# Patient Record
Sex: MALE | Race: WHITE | NOT HISPANIC OR LATINO | Employment: FULL TIME | URBAN - METROPOLITAN AREA
[De-identification: names, ages, dates, MRNs, and addresses within clinical notes are randomized per-mention and may not be internally consistent; named-entity substitution may affect disease eponyms.]

---

## 2019-01-31 ENCOUNTER — OFFICE VISIT (OUTPATIENT)
Dept: PSYCHIATRY | Facility: CLINIC | Age: 44
End: 2019-01-31

## 2019-01-31 DIAGNOSIS — G89.29 OTHER CHRONIC PAIN: Primary | ICD-10-CM

## 2019-01-31 DIAGNOSIS — F41.9 ANXIETY: ICD-10-CM

## 2019-01-31 PROCEDURE — 90791 PSYCH DIAGNOSTIC EVALUATION: CPT | Performed by: PSYCHOLOGIST

## 2021-05-12 ENCOUNTER — OFFICE VISIT (OUTPATIENT)
Dept: ORTHOPEDIC SURGERY | Facility: CLINIC | Age: 46
End: 2021-05-12

## 2021-05-12 VITALS
HEIGHT: 68 IN | WEIGHT: 224.4 LBS | HEART RATE: 106 BPM | BODY MASS INDEX: 34.01 KG/M2 | DIASTOLIC BLOOD PRESSURE: 89 MMHG | SYSTOLIC BLOOD PRESSURE: 147 MMHG

## 2021-05-12 DIAGNOSIS — Z98.890 HISTORY OF SHOULDER SURGERY: ICD-10-CM

## 2021-05-12 DIAGNOSIS — M25.512 LEFT SHOULDER PAIN, UNSPECIFIED CHRONICITY: Primary | ICD-10-CM

## 2021-05-12 PROCEDURE — 99204 OFFICE O/P NEW MOD 45 MIN: CPT | Performed by: PHYSICIAN ASSISTANT

## 2021-05-12 RX ORDER — ALLOPURINOL 300 MG/1
TABLET ORAL
COMMUNITY
Start: 2021-04-23

## 2021-05-12 RX ORDER — LISINOPRIL 10 MG/1
TABLET ORAL
COMMUNITY
Start: 2021-04-23 | End: 2021-09-23

## 2021-05-12 RX ORDER — MONTELUKAST SODIUM 10 MG/1
TABLET ORAL
COMMUNITY
Start: 2021-05-03

## 2021-05-12 RX ORDER — HYDROCHLOROTHIAZIDE 25 MG/1
TABLET ORAL
COMMUNITY
Start: 2021-04-23

## 2021-05-12 RX ORDER — MELOXICAM 15 MG/1
15 TABLET ORAL DAILY
Qty: 30 TABLET | Refills: 2 | Status: SHIPPED | OUTPATIENT
Start: 2021-05-12 | End: 2021-08-26

## 2021-05-12 NOTE — PROGRESS NOTES
"      [facility] [place] Orthopaedic Surgery Clinic Note    Subjective     CC: Pain of the Left Shoulder      HPI        Zack Crockett is a 46 y.o. male who presents with new problem of: left shoulder pain.  Onset: atraumatic and gradual in nature. The issue has been ongoing for 6 month(s). Pain is a 5/10 on the pain scale. Pain is described as aching. Associated symptoms include pain. The pain is worse with any movement of the joint; nothing improve the pain. Previous treatments have included: physical therapy and Surgery RCR 1/7/21 Dr. oLvett at .    I have reviewed the following portions of the patient's history:{Fara HPI:83692::\"History of Present Illness\"} and review of systems.            Review of Systems   Constitutional: Negative.  Negative for chills, fatigue and fever.   HENT: Negative.  Negative for congestion and dental problem.    Eyes: Negative.  Negative for blurred vision.   Respiratory: Negative.  Negative for shortness of breath.    Cardiovascular: Negative.  Negative for leg swelling.   Gastrointestinal: Negative.  Negative for abdominal pain.   Endocrine: Negative.  Negative for polyuria.   Genitourinary: Negative.  Negative for difficulty urinating.   Musculoskeletal: Positive for arthralgias.   Skin: Negative.    Allergic/Immunologic: Negative.    Neurological: Negative.    Hematological: Negative.  Negative for adenopathy.   Psychiatric/Behavioral: Negative.  Negative for behavioral problems.       ROS:    Constiutional:Pt denies fever, chills, nausea, or vomiting.  MSK:as above      Objective      Past Medical History  History reviewed. No pertinent past medical history.      Physical Exam  /89   Pulse 106   Ht 172.7 cm (68\")   Wt 102 kg (224 lb 6.4 oz)   BMI 34.12 kg/m²     Body mass index is 34.12 kg/m².    Patient is well nourished and well developed.        Ortho Exam  ***    Imaging/Labs/EMG Reviewed:  Imaging Results (Last 24 Hours)     ** No results found for the last 24 hours. **   "          Assessment:  No diagnosis found.    Plan:  1. Recommend over the counter anti-inflammatories for pain and/or swelling  2. ***    Follow Up:   No follow-ups on file.      Medical Decision Making  Management Options : {[facility] 21:[id number]}        Ashvin Singh M.D., FAAOS  Orthopedic Surgeon  Fellowship Trained Sports Medicine  [facility]  Orthopedics and Sports Medicine  [address] [place] [address], [address]  [place] [address]

## 2021-05-13 NOTE — PROGRESS NOTES
[facility] [place] Orthopaedic Surgery Clinic Note    Subjective     Patient: Zack Crockett  : 1975    Primary Care Provider: Provider, No Known    Requesting Provider: As above    Pain of the Left Shoulder      History    Chief Complaint: Left shoulder pain    History of Present Illness: This is a very pleasant 46-year-old male presenting to discuss his left shoulder pain that has been bothering him for approximately 6 months.  He is left-hand dominant.  He is status post left shoulder arthroscopy with debridement, labral repair, biceps tenodesis, rotator cuff repair and subacromial decompression with Dr. Arredondo in 2021.  He reports he had a period of time that he was pain-free after physical therapy.  He returned to work as a  in April and began having return pain at that time.  It is shoulder pain with some radiating arm pain.  He is also complaining of cervical pain headaches and tinnitus.  He has pain with any use of the shoulder as well as forearm rotation.  He is having decreased range of motion.    Current Outpatient Medications on File Prior to Visit   Medication Sig Dispense Refill   • allopurinol (ZYLOPRIM) 300 MG tablet      • hydroCHLOROthiazide (HYDRODIURIL) 25 MG tablet      • lisinopril (PRINIVIL,ZESTRIL) 10 MG tablet      • montelukast (SINGULAIR) 10 MG tablet        No current facility-administered medications on file prior to visit.      No Known Allergies   History reviewed. No pertinent past medical history.  History reviewed. No pertinent surgical history.  Family History   Problem Relation Age of Onset   • Cancer Mother    • Diabetes Mother    • Hypertension Mother    • Cancer Father    • Hypertension Father       Social History     Socioeconomic History   • Marital status:      Spouse name: Not on file   • Number of children: Not on file   • Years of education: Not on file   • Highest education level: Not on file   Tobacco Use   • Smoking status: Never Smoker   •  "Smokeless tobacco: Never Used   Substance and Sexual Activity   • Alcohol use: Never   • Drug use: Never   • Sexual activity: Defer        Review of Systems    The following portions of the patient's history were reviewed and updated as appropriate: allergies, current medications, past family history, past medical history, past social history, past surgical history and problem list.      Objective      Physical Exam  /89   Pulse 106   Ht 172.7 cm (68\")   Wt 102 kg (224 lb 6.4 oz)   BMI 34.12 kg/m²     Body mass index is 34.12 kg/m².    GENERAL: Body habitus: obese    Gait: normal     Mental Status:  awake and alert; oriented to person, place, and time    Voice:  clear  SKIN:  Normal    Hair Growth:  Right:normal; Left:  normal  HEENT: Head: Normocephalic, atraumatic,  without obvious abnormality.  eye: normal external eye, no icterus   PULM:  Repiratory effort normal    Ortho Exam  Musculoskeletal   Upper Extremity   Left Shoulder     Inspection and Palpation:     Tenderness -tender over the anterior lateral acromion.  Tenderness over the traps.  Tender at the bicipital groove.    Crepitus - none    Sensation is normal    Examination reveals no ecchymosis.      Strength and Tone:    Supraspinatus - 4/5    External Rotators-4/5    Infraspinatus - 4/5    Subscapularis - 4/5    Deltoid - 5/5     Range of Motion   Right shoulder:    Internal Rotation: ROM - T7    External Rotation: AROM - 80 degrees    Elevation through flexion: AROM - 180 degrees    Left Shoulder:        External Rotation: AROM - 30 degrees    Elevation through flexion: AROM - 140 degrees     Abduction AROM - 90      Impingement   Left shoulder    Arreguin-John impingement test negative    Neer impingement test get of     Functional Testing   Left shoulder    AC crossover adduction test negative      Medical Decision Making    Data Review:   ordered and reviewed x-rays today and reviewed outside records    Assessment:  1. Left shoulder " pain, unspecified chronicity    2. History of shoulder surgery        Plan:  Left shoulder pain status post left shoulder arthroscopy with debridement, labral repair, rotator cuff repair, biceps tenodesis and subacromial decompression in an outside clinic.  X-rays today show 2 metal anchors in the humeral head with no evidence of any acute or chronic bony findings.  Patient has had return pain following surgery after returning to work as a .  It is getting progressively worse.  Plan today is MRI arthrogram of the left shoulder for further evaluation.  He will return to see Dr. Singh following the study or sooner if needed.  I have given him a prescription for Mobic.    History, diagnosis and treatment plan discussed with Dr. Singh.      Gely Zelaya PA-C  05/13/21  10:36 EDT

## 2021-05-20 ENCOUNTER — TELEPHONE (OUTPATIENT)
Dept: ORTHOPEDIC SURGERY | Facility: CLINIC | Age: 46
End: 2021-05-20

## 2021-05-26 DIAGNOSIS — Z98.890 HISTORY OF SHOULDER SURGERY: ICD-10-CM

## 2021-05-26 DIAGNOSIS — M25.512 LEFT SHOULDER PAIN, UNSPECIFIED CHRONICITY: Primary | ICD-10-CM

## 2021-06-14 ENCOUNTER — HOSPITAL ENCOUNTER (OUTPATIENT)
Dept: GENERAL RADIOLOGY | Facility: HOSPITAL | Age: 46
Discharge: HOME | End: 2021-06-14

## 2021-06-14 ENCOUNTER — HOSPITAL ENCOUNTER (OUTPATIENT)
Dept: MRI IMAGING | Facility: HOSPITAL | Age: 46
Discharge: HOME | End: 2021-06-14

## 2021-06-14 DIAGNOSIS — Z98.890 HISTORY OF SHOULDER SURGERY: ICD-10-CM

## 2021-06-14 DIAGNOSIS — M25.512 LEFT SHOULDER PAIN, UNSPECIFIED CHRONICITY: ICD-10-CM

## 2021-06-14 PROCEDURE — A9577 INJ MULTIHANCE: HCPCS | Performed by: PHYSICIAN ASSISTANT

## 2021-06-14 PROCEDURE — 25010000002 IOPAMIDOL 61 % SOLUTION: Performed by: PHYSICIAN ASSISTANT

## 2021-06-14 PROCEDURE — 0 GADOBENATE DIMEGLUMINE 529 MG/ML SOLUTION: Performed by: PHYSICIAN ASSISTANT

## 2021-06-14 PROCEDURE — 77002 NEEDLE LOCALIZATION BY XRAY: CPT

## 2021-06-14 PROCEDURE — 73222 MRI JOINT UPR EXTREM W/DYE: CPT

## 2021-06-14 RX ORDER — LIDOCAINE HYDROCHLORIDE 10 MG/ML
10 INJECTION, SOLUTION EPIDURAL; INFILTRATION; INTRACAUDAL; PERINEURAL ONCE
Status: COMPLETED | OUTPATIENT
Start: 2021-06-14 | End: 2021-06-14

## 2021-06-14 RX ADMIN — IOPAMIDOL 10 ML: 612 INJECTION, SOLUTION INTRAVENOUS at 15:52

## 2021-06-14 RX ADMIN — LIDOCAINE HYDROCHLORIDE 10 ML: 10 INJECTION, SOLUTION EPIDURAL; INFILTRATION; INTRACAUDAL; PERINEURAL at 15:52

## 2021-06-14 RX ADMIN — GADOBENATE DIMEGLUMINE 1 ML: 529 INJECTION, SOLUTION INTRAVENOUS at 15:52

## 2021-06-22 ENCOUNTER — OFFICE VISIT (OUTPATIENT)
Dept: ORTHOPEDIC SURGERY | Facility: CLINIC | Age: 46
End: 2021-06-22

## 2021-06-22 VITALS
HEART RATE: 88 BPM | BODY MASS INDEX: 33.35 KG/M2 | HEIGHT: 68 IN | WEIGHT: 220.02 LBS | SYSTOLIC BLOOD PRESSURE: 133 MMHG | DIASTOLIC BLOOD PRESSURE: 88 MMHG

## 2021-06-22 DIAGNOSIS — M75.122 NONTRAUMATIC COMPLETE TEAR OF LEFT ROTATOR CUFF: Primary | ICD-10-CM

## 2021-06-22 PROCEDURE — 99214 OFFICE O/P EST MOD 30 MIN: CPT | Performed by: PHYSICIAN ASSISTANT

## 2021-06-22 NOTE — PROGRESS NOTES
I have reviewed the notes, assessments, and/or procedures performed by Gely Zelaya PA-C, I concur with her documentation of Zack Crockett.      Patient seen and examined with Gely Zelaya this morning.  Patient is a pleasant gentleman who is left-hand dominant who underwent left shoulder arthroscopy with reported labral repair, rotator cuff repair, biceps tenodesis, and subacromial decompression at  with Dr. Looney in January 2021.  Patient reportedly did quite well during his recovery and was sent back to work in April 2021 (this is not a work-related injury) as a  and feels like he overdid it and began to have trouble at work and increasing pain and dysfunction.  He was seen initially by Gely and an MR arthrogram was ordered and the patient is here for follow-up.    Patient's clinical examination shows full passive forward elevation.  His active forward elevation is to about 120 degrees.  He has external rotation to 60 degrees and internal rotation to L4.  He has 5-5 strength of his subscap.  He has 4- out of 5 strength of his supraspinatus and external rotators.  He is nontender at the AC joint to palpation.  There is no visible Quinn deformity.  He is tender about the course of the long head of the biceps to palpation    We reviewed images and report of his MR arthrogram of the left shoulder.  Patient appears to have an intact subscap.  He appears to have a fairly attenuated and torn and retracted supraspinatus and infraspinatus tear.  There does not appear to be significant amount of visible atrophy of the muscle bellies.    Assessment and plan: Recurrent left rotator cuff tear after repair and biceps tenodesis and reported labral repair--plan will be to obtain notes and operative note to figure out exactly where the biceps was tenodesed and how and also to figure out the rotator cuff repair construct.  Patient is having significant dysfunction and pain in the shoulder.  We discussed  the risk, benefits, potential hazards, typical recovery and rehab as well as reasonable alternatives to left shoulder arthroscopy, possible revision biceps tenodesis, revision rotator cuff repair with likely augmentation with Regeneten.  Patient had the opportunity to ask questions and wishes to proceed with scheduling.  We will get this done as an outpatient in the near future at the hospital.

## 2021-07-06 ENCOUNTER — APPOINTMENT (OUTPATIENT)
Dept: PREADMISSION TESTING | Facility: HOSPITAL | Age: 46
End: 2021-07-06

## 2021-07-06 LAB — SARS-COV-2 RNA PNL SPEC NAA+PROBE: NOT DETECTED

## 2021-07-06 PROCEDURE — C9803 HOPD COVID-19 SPEC COLLECT: HCPCS

## 2021-07-06 PROCEDURE — U0004 COV-19 TEST NON-CDC HGH THRU: HCPCS

## 2021-07-09 ENCOUNTER — OUTSIDE FACILITY SERVICE (OUTPATIENT)
Dept: ORTHOPEDIC SURGERY | Facility: CLINIC | Age: 46
End: 2021-07-09

## 2021-07-09 ENCOUNTER — LAB REQUISITION (OUTPATIENT)
Dept: LAB | Facility: HOSPITAL | Age: 46
End: 2021-07-09

## 2021-07-09 DIAGNOSIS — Z01.818 ENCOUNTER FOR OTHER PREPROCEDURAL EXAMINATION: ICD-10-CM

## 2021-07-09 DIAGNOSIS — Z98.890 S/P COMPLETE REPAIR OF ROTATOR CUFF: Primary | ICD-10-CM

## 2021-07-09 DIAGNOSIS — I10 ESSENTIAL (PRIMARY) HYPERTENSION: ICD-10-CM

## 2021-07-09 LAB — POTASSIUM SERPL-SCNC: 3.8 MMOL/L (ref 3.5–5.2)

## 2021-07-09 PROCEDURE — 29827 SHO ARTHRS SRG RT8TR CUF RPR: CPT | Performed by: ORTHOPAEDIC SURGERY

## 2021-07-09 PROCEDURE — 29823 SHO ARTHRS SRG XTNSV DBRDMT: CPT | Performed by: ORTHOPAEDIC SURGERY

## 2021-07-09 PROCEDURE — 84132 ASSAY OF SERUM POTASSIUM: CPT | Performed by: ORTHOPAEDIC SURGERY

## 2021-07-09 RX ORDER — HYDROCODONE BITARTRATE AND ACETAMINOPHEN 7.5; 325 MG/1; MG/1
1 TABLET ORAL EVERY 6 HOURS PRN
Qty: 30 TABLET | Refills: 0 | Status: SHIPPED | OUTPATIENT
Start: 2021-07-09 | End: 2021-07-27

## 2021-07-27 ENCOUNTER — OFFICE VISIT (OUTPATIENT)
Dept: ORTHOPEDIC SURGERY | Facility: CLINIC | Age: 46
End: 2021-07-27

## 2021-07-27 VITALS — TEMPERATURE: 97.3 F

## 2021-07-27 DIAGNOSIS — Z98.890 S/P COMPLETE REPAIR OF ROTATOR CUFF: Primary | ICD-10-CM

## 2021-07-27 DIAGNOSIS — M75.122 NONTRAUMATIC COMPLETE TEAR OF LEFT ROTATOR CUFF: ICD-10-CM

## 2021-07-27 PROCEDURE — 99024 POSTOP FOLLOW-UP VISIT: CPT | Performed by: PHYSICIAN ASSISTANT

## 2021-07-27 NOTE — PROGRESS NOTES
[facility] [place] [facility] [place]        Subjective     Post-op (2 weeks s/p (L) shoulder arthroscopy with RCR, debridement of labrum and acromion capsule, removal of suture material 07/09/2021)       CAMILA Crockett is a 46 y.o. male. Patient presents for their initial postop visit following left shoulder arthroscopy with arthroscopic rotator cuff repair performed on the above date by Dr. Newell.    Pain scale: 0-1/10.  Wearing his post operative sling as directed.  No numbness or tingling.    No reported fever, chills, night sweats or other constitutional symptoms.        Objective      Physical Exam  Temp 97.3 °F (36.3 °C)     There is no height or weight on file to calculate BMI.        Ortho Exam  Peripheral Vascular   Left Upper Extremity    No cyanotic nail beds    Pink nail beds and rapid capillary refill   Palpation    Radial Pulse - Bilaterally normal    Musculoskeletal   Upper Extremity   Left Shoulder    Inspection and palpation:    Tenderness - mild    Swelling - mild    Sensation - normal    Incision--healing appropriately with sutures in place.  No redness, warmth, drainage or evidence of infection.    Motor: Grossly intact axillary, MSC, radial, ulnar, median, AIN, PIN.    Sensory: Grossly intact to light touch axillary, MSC, radial, ulnar, median nerve distributions.    Vascular: 2+ radial pulse with brisk capillary refill noted and each digit.   ROJM:   Left:   External Rotation: PROM - neutral degrees   Elevation through flexion: PROM - 100 degrees      Imaging Reviewed:  No new imaging today.      Assessment:  1. S/P complete repair of rotator cuff    2. Nontraumatic complete tear of left rotator cuff           Plan:  1. Status post left shoulder arthroscopy with RCR, stable.  2. Sutures were removed today.  3. Patient provided home shoulder exercises.  4. Continue use of sling, except for when preforming hygiene or exercises.  This does include sleeping in it.  Pillow portion  was removed today.  5. Continue OTC pain medication.  6. Follow up in 4 weeks for repeat evaluation with Dr. Newell.  7. Questions and concerns answered.      Nona Dior PA-C  07/28/21  19:27 EDT      Dragon disclaimer:  Much of this encounter note is an electronic transcription/translation of spoken language to printed text. The electronic translation of spoken language may permit erroneous, or at times, nonsensical words or phrases to be inadvertently transcribed; Although I have reviewed the note for such errors, some may still exist.

## 2021-08-26 ENCOUNTER — OFFICE VISIT (OUTPATIENT)
Dept: ORTHOPEDIC SURGERY | Facility: CLINIC | Age: 46
End: 2021-08-26

## 2021-08-26 DIAGNOSIS — Z98.890 S/P COMPLETE REPAIR OF ROTATOR CUFF: Primary | ICD-10-CM

## 2021-08-26 DIAGNOSIS — M75.122 NONTRAUMATIC COMPLETE TEAR OF LEFT ROTATOR CUFF: ICD-10-CM

## 2021-08-26 PROCEDURE — 99024 POSTOP FOLLOW-UP VISIT: CPT | Performed by: ORTHOPAEDIC SURGERY

## 2021-08-26 RX ORDER — CHOLESTYRAMINE 4 G/9G
POWDER, FOR SUSPENSION ORAL
COMMUNITY
Start: 2021-08-12

## 2021-08-26 NOTE — PROGRESS NOTES
[facility] [place] [place] [facility] [place]        Subjective     Post-op (4 week f/u 7 weeks s/p (L) shoulder arthroscopy with RCR, debridement of labrum and acromion capsule, removal of suture material 07/09/2021))       CAMILA Crockett is a 46 y.o. male.  Patient returns now 7 weeks out from arthroscopy of the left shoulder with revision repair of a massive rotator cuff tear.  Patient tells me he has pain with movement of the shoulder.  He has been doing his home exercises.          Objective      Physical Exam  There were no vitals taken for this visit.    There is no height or weight on file to calculate BMI.        Ortho Exam  Peripheral Vascular   Left Upper Extremity    No cyanotic nail beds    Pink nail beds and rapid capillary refill   Palpation    Radial Pulse - Bilaterally normal    Musculoskeletal   Upper Extremity   Left Shoulder    Inspection and palpation:    Tenderness - mild    Swelling - none    Sensation - normal    Incision--healing appropriately, no drainage   ROJM:   Left:   External Rotation: PROM - 30 degrees   Elevation through flexion: PROM - 100 degrees        Imaging Reviewed:  Imaging Results (Last 24 Hours)     ** No results found for the last 24 hours. **            Assessment    Assessment:  1. S/P complete repair of rotator cuff    2. Nontraumatic complete tear of left rotator cuff        Plan:  1. Patient will initiate rehab per massive rotator cuff protocol.  He will do this close to home in [facility].  I will see him back in a month.  He will wean from the sling.  He still needs to go slow.  We will keep him off work in the interim.  Guarded optimism currently given the size of the tear.      Norman Newell MD  08/26/21  13:25 EDT      Dragon disclaimer:  Much of this encounter note is an electronic transcription/translation of spoken language to printed text. The electronic translation of spoken language may permit erroneous, or at times, nonsensical words or  phrases to be inadvertently transcribed; Although I have reviewed the note for such errors, some may still exist.

## 2021-09-01 RX ORDER — MELOXICAM 15 MG/1
15 TABLET ORAL DAILY
Qty: 30 TABLET | Refills: 2 | OUTPATIENT
Start: 2021-09-01

## 2021-09-23 ENCOUNTER — OFFICE VISIT (OUTPATIENT)
Dept: ORTHOPEDIC SURGERY | Facility: CLINIC | Age: 46
End: 2021-09-23

## 2021-09-23 DIAGNOSIS — Z98.890 S/P COMPLETE REPAIR OF ROTATOR CUFF: Primary | ICD-10-CM

## 2021-09-23 PROCEDURE — 99024 POSTOP FOLLOW-UP VISIT: CPT | Performed by: ORTHOPAEDIC SURGERY

## 2021-09-23 RX ORDER — FLUTICASONE PROPIONATE 50 MCG
SPRAY, SUSPENSION (ML) NASAL AS NEEDED
COMMUNITY
Start: 2021-08-25

## 2021-09-23 RX ORDER — LISINOPRIL 20 MG/1
TABLET ORAL
COMMUNITY
Start: 2021-08-30

## 2021-09-23 NOTE — PROGRESS NOTES
[facility] [place] Orthopaedic Surgery Clinic Note        Subjective     Post-op (1 month follow up; 2.5 months s/p (L) shoulder arthroscopy with RCR, debridement of labrum and acromion capsule, removal of suture material 07/09/2021)       CAMILA Crockett is a 46 y.o. male.  Patient now 2 and half months out from left shoulder arthroscopy with revision rotator cuff repair.  Patient is doing well overall.  He is doing physical therapy.  He is asking go back to work on light duty or some capacity.          Objective      Physical Exam  There were no vitals taken for this visit.    There is no height or weight on file to calculate BMI.        Ortho Exam  Peripheral Vascular   Left Upper Extremity    No cyanotic nail beds    Pink nail beds and rapid capillary refill   Palpation    Radial Pulse - Bilaterally normal    Musculoskeletal   Upper Extremity   Left Shoulder    Inspection and palpation:    Tenderness - mild    Swelling - none    Sensation - normal    Incision--healing appropriately, no drainage   ROJM:   Left:   External Rotation: PROM - 30 degrees   Elevation through flexion: PROM - 130 degrees        Imaging Reviewed:  Imaging Results (Last 24 Hours)     ** No results found for the last 24 hours. **            Assessment    Assessment:  1. S/P complete repair of rotator cuff        Plan:  1. Status post revision rotator cuff repair--patient continues to improve.  I told him this can be a slow process.  I will let him go back to work being a  but no driving effective tomorrow.  I will see him back in 6 weeks.  Continue PT per massive cuff repair protocol.      Norman Newell MD  09/23/21  10:39 EDT      Dragon disclaimer:  Much of this encounter note is an electronic transcription/translation of spoken language to printed text. The electronic translation of spoken language may permit erroneous, or at times, nonsensical words or phrases to be inadvertently transcribed; Although I have reviewed  the note for such errors, some may still exist.

## 2021-11-04 ENCOUNTER — OFFICE VISIT (OUTPATIENT)
Dept: ORTHOPEDIC SURGERY | Facility: CLINIC | Age: 46
End: 2021-11-04

## 2021-11-04 VITALS
HEIGHT: 68 IN | BODY MASS INDEX: 33.35 KG/M2 | HEART RATE: 80 BPM | WEIGHT: 220.02 LBS | SYSTOLIC BLOOD PRESSURE: 138 MMHG | DIASTOLIC BLOOD PRESSURE: 78 MMHG

## 2021-11-04 DIAGNOSIS — Z98.890 S/P COMPLETE REPAIR OF ROTATOR CUFF: Primary | ICD-10-CM

## 2021-11-04 DIAGNOSIS — S49.92XA INJURY OF LEFT SHOULDER, INITIAL ENCOUNTER: ICD-10-CM

## 2021-11-04 PROCEDURE — 99214 OFFICE O/P EST MOD 30 MIN: CPT | Performed by: ORTHOPAEDIC SURGERY

## 2021-11-04 RX ORDER — DICLOFENAC SODIUM 75 MG/1
TABLET, DELAYED RELEASE ORAL
COMMUNITY
Start: 2021-10-25

## 2021-11-04 NOTE — PROGRESS NOTES
"    [facility] [place] Orthopaedic Surgery Clinic Note        Subjective     CC: Follow-up (6 week follow up; 4 months s/p (L) shoulder arthroscopy with RCR, debridement of labrum and acromion capsule, removal of suture material 07/09/2021)      CAMILA Crockett is a 46 y.o. male.  Patient is now 4 months out from left shoulder arthroscopy with revision rotator cuff repair.  Patient was get a cervical spine injection with Dr. Carrasco when the patient went to stand up he initially felt fine but the next thing he knew he had passed out and landed on his left shoulder.  Patient was having some issues with the left shoulder prior to falling and feels like his physical therapy was not tailored to him and had him doing too much too soon.  Since his fall, patient's shoulder pain has been severe    Overall, patient's symptoms are as above.    ROS:    Constiutional:Pt denies fever, chills, nausea, or vomiting.  MSK:as above        Objective      Past Medical History  No past medical history on file.      Physical Exam  /78   Pulse 80   Ht 172.7 cm (67.99\")   Wt 99.8 kg (220 lb 0.3 oz)   BMI 33.46 kg/m²     Body mass index is 33.46 kg/m².    Patient is well nourished and well developed.        Ortho Exam  Musculoskeletal   Upper Extremity   Left Shoulder       Strength and Tone:    Supraspinatus -3-5 with pain    External Clqatila-9-1 with pain    Infraspinatus - 5/5    Subscapularis - 5/5    Deltoid - 5/5     Range of Motion      Left Shoulder:    Internal Rotation: ROM - L4    External Rotation: AROM - 70 degrees    Elevation through flexion: AROM - 140 degrees     AC joint:  non tender to palpation    AC joint:  negative crossover          Imaging/Labs/EMG Reviewed:  Imaging Results (Last 24 Hours)     Procedure Component Value Units Date/Time    XR Shoulder 2+ View Left [[id number]] Resulted: 11/04/21 1234     Updated: 11/04/21 1235    Narrative:      Left Shoulder X-Ray    Indication: Pain, fall after revision rotator " cuff repair    Study:  AP, axillary lateral, and scapular Y views    Comparison: Left shoulder 5/12/2021    Findings:  Metallic anchors are noted in the humeral head consistent with prior   rotator cuff repair.  No acute fractures are visualized  No bony lesions are visualized.  Normal soft tissue appearance  AC joint: Moderate joint space narrowing  Glenohumeral joint: Minimal joint space narrowing  Acromion type: 2  Compared to the prior study, there has been an increase in the amount of   proximal migration of the humeral head on the AP view.      Impression:    No acute bony abnormalities noted  Status post rotator cuff repair  Increased proximal migration of the humeral head compared to prior study              Assessment    Assessment:  1. S/P complete repair of rotator cuff    2. Injury of left shoulder, initial encounter        Plan:  1. Recommend over the counter anti-inflammatories for pain and/or swelling  2. Left shoulder injury after revision rotator cuff repair--concern is for retear.  Patient has more proximal migration on today's x-ray than his x-ray in May 2021.  I am concerned about repairability and if this proves to be retorn, superior capsular reconstruction may be considered.  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      Norman Newell MD  11/04/21  12:57 EDT      Dragon disclaimer:  Much of this encounter note is an electronic transcription/translation of spoken language to printed text. The electronic translation of spoken language may permit erroneous, or at times, nonsensical words or phrases to be inadvertently transcribed; Although I have reviewed the note for such errors, some may still exist.

## 2021-12-08 ENCOUNTER — HOSPITAL ENCOUNTER (OUTPATIENT)
Dept: MRI IMAGING | Facility: HOSPITAL | Age: 46
Discharge: HOME | End: 2021-12-08
Admitting: ORTHOPAEDIC SURGERY

## 2021-12-08 DIAGNOSIS — Z98.890 S/P COMPLETE REPAIR OF ROTATOR CUFF: ICD-10-CM

## 2021-12-08 PROCEDURE — 73221 MRI JOINT UPR EXTREM W/O DYE: CPT

## 2022-01-13 ENCOUNTER — OFFICE VISIT (OUTPATIENT)
Dept: ORTHOPEDIC SURGERY | Facility: CLINIC | Age: 47
End: 2022-01-13

## 2022-01-13 VITALS
HEIGHT: 68 IN | WEIGHT: 223.8 LBS | BODY MASS INDEX: 33.92 KG/M2 | SYSTOLIC BLOOD PRESSURE: 140 MMHG | DIASTOLIC BLOOD PRESSURE: 99 MMHG

## 2022-01-13 DIAGNOSIS — S49.92XD INJURY OF LEFT SHOULDER, SUBSEQUENT ENCOUNTER: Primary | ICD-10-CM

## 2022-01-13 DIAGNOSIS — Z98.890 S/P COMPLETE REPAIR OF ROTATOR CUFF: ICD-10-CM

## 2022-01-13 PROCEDURE — 99213 OFFICE O/P EST LOW 20 MIN: CPT | Performed by: ORTHOPAEDIC SURGERY

## 2022-01-13 RX ORDER — CIPROFLOXACIN 500 MG/1
TABLET, FILM COATED ORAL
COMMUNITY
Start: 2022-01-12

## 2022-01-13 RX ORDER — BACLOFEN 10 MG/1
TABLET ORAL
COMMUNITY
Start: 2021-12-10

## 2022-01-13 NOTE — PROGRESS NOTES
"    [facility] [place] [facility] Note        Subjective     CC: Follow-up (Left Shoulder Pain after MRI Left Shoulder WO 12/8/21, hx  s/p (L) shoulder arthroscopy with RCR, debridement of labrum and acromion capsule, removal of suture material 07/09/2021)      CAMILA Crockett is a 46 y.o. male.  Patient returns for follow-up after the MRI of his left shoulder.  He has gone back to work driving buses.  He has a little bit of weakness with overhead activity.  He has stopped PT.  He is feeling good overall.    Overall, patient's symptoms are as above.    ROS:    Constiutional:Pt denies fever, chills, nausea, or vomiting.  MSK:as above        Objective      Past Medical History  No past medical history on file.      Physical Exam  /99   Ht 172.7 cm (67.99\")   Wt 102 kg (223 lb 12.8 oz)   BMI 34.04 kg/m²     Body mass index is 34.04 kg/m².    Patient is well nourished and well developed.        Ortho Exam  Musculoskeletal   Upper Extremity   Left Shoulder       Strength and Tone:    Supraspinatus -5 out of 5    External Rotators-5/5    Infraspinatus - 5/5    Subscapularis - 5/5    Deltoid - 5/5     Range of Motion      Left Shoulder:    Internal Rotation: ROM - L4    External Rotation: AROM - 70 degrees    Elevation through flexion: AROM - 140 degrees     AC joint:  non tender to palpation    AC joint:  negative crossover          Imaging/Labs/EMG Reviewed:  Imaging Results (Last 24 Hours)     ** No results found for the last 24 hours. **        MRI Shoulder Left Without Contrast  Narrative: EXAMINATION: MRI SHOULDER LEFT WO CONTRAST - 12/08/2021     INDICATION: Z98.890-Other specified postprocedural states. Left shoulder  pain.     TECHNIQUE: Axial proton density, sagittal T1 and STIR, coronal T1 and T2  fat-sat images of the left shoulder.     COMPARISON: Left shoulder plain films 11/04/2021. Previous left shoulder  MRI arthrogram of 06/14/2021.     FINDINGS: History indicates left shoulder " arthroscopy four months ago,  with revision rotator cuff repair, subsequent fall. Left shoulder  concern for re-tear. History of earlier labral repair, rotator cuff  repair, biceps tenodesis and subacromial decompression at [facility].     Susceptibility artifact is now seen from previous rotator cuff repair in  the superolateral humeral head. AC joint arthropathy is again noted.  Coronal images are motion degraded but the supraspinatus appears at  least grossly intact, with some increased internal signal whether  tendinopathy or partial-thickness undersurface tear. Sagittal images  appear to show a torn infraspinatus, however, as can be appreciated from  sagittal STIR image #15 to image #22. Teres minor tendon and  subscapularis appear intact and normal. By history there is previous  biceps tenodesis. There is expected rounded morphology of the superior  labrum. Posterior labrum and inferior labrum appear intact. Anterior  labrum is less well-defined than on the CT arthrogram, although in part  this may be due to motion degraded study. Some degree of labral  degeneration may be present. No evidence of acute or healing bony trauma  is seen. No rotator cuff muscle atrophy is appreciated.     Impression: 1. Torn distal infraspinatus.  2. Mildly increased signal in the grossly intact appearing  supraspinatus, whether post surgical change, tendinopathy, or poorly  seen partial-thickness tear. No well-defined supraspinatus tear is  identified.  3. Previous postsurgical changes as noted.     DICTATED:   12/08/2021  EDITED/lfs:   12/08/2021        This report was finalized on 12/8/2021 10:20 PM by Dr. Troy Ayala MD.     We reviewed images and report of the MRI above.  We have compared these to his preop imaging which shows the majority of his cuff to be intact.  He does have a tear of the infraspinatus which either did not heal or is a new injury     Assessment    Assessment:  1. Injury of left shoulder,  subsequent encounter    2. S/P complete repair of rotator cuff        Plan:  1. Recommend over the counter anti-inflammatories for pain and/or swelling  2. Patient is now 6 months out from revision left rotator cuff repair--patient has a small defect in the infraspinatus.  Functionally he is doing well and is back to work.  I will leave him alone now and I do not believe he needs any further surgery.  Follow-up as needed going forward for his shoulder.  We will give him a set of deltoid and scapular exercises to perform as well as a Thera-Band.      Norman Newell MD  01/13/22  12:34 EST      Dictated Utilizing Dragon Dictation.

## 2022-03-22 ENCOUNTER — OFFICE VISIT (OUTPATIENT)
Dept: ORTHOPEDIC SURGERY | Facility: CLINIC | Age: 47
End: 2022-03-22

## 2022-03-22 VITALS
HEIGHT: 68 IN | BODY MASS INDEX: 33.56 KG/M2 | SYSTOLIC BLOOD PRESSURE: 144 MMHG | WEIGHT: 221.4 LBS | DIASTOLIC BLOOD PRESSURE: 82 MMHG

## 2022-03-22 DIAGNOSIS — M25.562 PAIN IN BOTH KNEES, UNSPECIFIED CHRONICITY: ICD-10-CM

## 2022-03-22 DIAGNOSIS — M17.0 PRIMARY OSTEOARTHRITIS OF BOTH KNEES: Primary | ICD-10-CM

## 2022-03-22 DIAGNOSIS — M25.561 PAIN IN BOTH KNEES, UNSPECIFIED CHRONICITY: ICD-10-CM

## 2022-03-22 PROCEDURE — 99213 OFFICE O/P EST LOW 20 MIN: CPT | Performed by: ORTHOPAEDIC SURGERY

## 2022-03-22 NOTE — PROGRESS NOTES
[facility] [place] [facility] [place]        Subjective     Pain of the Right Knee and Pain of the Left Knee      HPI      Zack Crockett is a 46 y.o. male who presents with new problem of: bilateral knee pain.  Onset: atraumatic and gradual in nature. The issue has been ongoing for 3 year(s). Pain is a 5/10 on the pain scale. Pain is described as aching and burning. Associated symptoms include pain, popping and grinding. The pain is worse with walking, standing, climbing stairs and sleeping; heat improve the pain. Previous treatments have included: bracing and NSAIDS.    I have reviewed the following portions of the patient's history:History of Present Illness and review of systems.          Patient here for new problem day regarding his right more than left knee.  Has seen multiple providers in the past to have provided steroid injections and Visco injections.  He states that his left knee is tolerable but his right knee is significant enough that it affects his activities of daily living.      No past medical history on file.   Past Surgical History:   Procedure Laterality Date   • SHOULDER SURGERY Left 07/09/2021    shoulder arthroscopy with RCR, debridement of labrum and acromion capsule      Family History   Problem Relation Age of Onset   • Cancer Mother    • Diabetes Mother    • Hypertension Mother    • Cancer Father    • Hypertension Father      Social History     Socioeconomic History   • Marital status:    Tobacco Use   • Smoking status: Never Smoker   • Smokeless tobacco: Never Used   Substance and Sexual Activity   • Alcohol use: Never   • Drug use: Never   • Sexual activity: Defer      Current Outpatient Medications on File Prior to Visit   Medication Sig Dispense Refill   • allopurinol (ZYLOPRIM) 300 MG tablet      • baclofen (LIORESAL) 10 MG tablet      • cholestyramine (QUESTRAN) 4 g packet      • ciprofloxacin (CIPRO) 500 MG tablet      • diclofenac (VOLTAREN) 75 MG EC tablet      •  "fluticasone (FLONASE) 50 MCG/ACT nasal spray As Needed.     • hydroCHLOROthiazide (HYDRODIURIL) 25 MG tablet      • lisinopril (PRINIVIL,ZESTRIL) 20 MG tablet      • montelukast (SINGULAIR) 10 MG tablet        No current facility-administered medications on file prior to visit.      No Known Allergies       Review of Systems   Constitutional: Negative.    HENT: Negative.    Eyes: Negative.    Respiratory: Negative.    Cardiovascular: Negative.    Gastrointestinal: Negative.    Endocrine: Negative.    Genitourinary: Negative.    Musculoskeletal: Positive for arthralgias.   Skin: Negative.    Allergic/Immunologic: Negative.    Neurological: Negative.    Hematological: Negative.    Psychiatric/Behavioral: Negative.         I reviewed the patient's chief complaint, history of present illness, review of systems, past medical history, surgical history, family history, social history, medications and allergy list.        Objective      Physical Exam  /82   Ht 172.7 cm (67.99\")   Wt 100 kg (221 lb 6.4 oz)   BMI 33.67 kg/m²     Body mass index is 33.67 kg/m².    General  Mental Status - alert  General Appearance - cooperative, well groomed, not in acute distress  Orientation - Oriented X3  Build & Nutrition - well developed and well nourished  Posture - normal posture  Gait - normal gait       Ortho Exam  Peripheral Vascular:    Upper Extremity:   Inspection:  Left--no cyanotic nail beds Right--no cyanotic nail beds   Bilateral:  Pink nail beds with brisk capillary refill   Palpation:  Bilateral radial pulse normal    Musculoskeletal:  Global Assessment:  Overall assessment of Lower Extremity Muscle Strength and Tone:  Left quadriceps--5/5   Left hamstrings--5/5       Left tibialis anterior--5/5  Left gastroc-soleus--5/5  Left EHL--5/5    Right quadriceps--5/5  Right hamstrings--5/5  Right tibialis anterior--5/5  Right gastroc soleus--5/5  Right EHL--5/5    Lower Extremity:  Knee/Patella:  No digital clubbing or " cyanosis.    Examination of left and right knees reveals:  Normal deep tendon reflexes, coordination, strength, tone, sensation.  No known fractures or deformities.    Inspection and Palpation:    Left knee:  Tenderness:  Over the medial joint line and moderate severity  Effusion:  1+  Crepitus:  Positive  Pulses:  2+  Ecchymosis:  None  Warmth:  None     Right knee:  Tenderness:  Over the medial joint line and moderate severity  Effusion:  1+  Crepitus:  Positive  Pulses:  2+  Ecchymosis:  None  Warmth:  None     ROM:  Right:  Extension:5    Flexion:120  Left:  Extension:5     Flexion:120    Instability:    Left:  Lachman Test:  Negative, Varus stress test negative, Valgus stress test negative  Right:  Lachman Test:  Negative, Varus stress test negative, Valgus stress test negative    Deformities/Malalignments/Discrepancies:    Left:  Genu Varum   Right:  Genu Varum    Functional Testing:  Right:  Linda's test:  Negative  Patella grind test:  Positive  Q-angle:  Normal    Left:  Linda's test:  Negative  Patella grind test:  Positive  Q-angle:  normal    Imaging/Studies  Imaging Results (Last 24 Hours)     Procedure Component Value Units Date/Time    XR Knee 4+ View Bilateral [[id number]] Resulted: 03/22/22 1053     Updated: 03/22/22 1054    Narrative:      Right knee X-Ray    Indication: Pain    Study:  Upright AP, Skiers, Lateral, and Sunrise views of Right knee(s)    Comparison: None    Findings:    Patient appears to have end-stage hypertrophic degenerative changes in the   medial compartment.    There are mild degenerative changes in the lateral compartment.    There are early moderate changes in the patellofemoral compartment.    Patient has overall varus alignment.    Kellgren-Kd thGthrthathdtheth:th th4th Impression:   End-stage medial compartment and early moderate patellofemoral compartment   degnerative changes of the knee           Left knee X-Ray    Indication: Pain    Study:  Upright AP, Skiers, Lateral,  and Sunrise views of Left knee(s)    Comparison: None    Findings:    Patient appears to have moderate degenerative changes in the medial   compartment.    There are mild degenerative changes in the lateral compartment.    There are mild changes in the patellofemoral compartment.    Patient has overall varus alignment.    Kellgren-Kd stGstrstastdstest:st st1st Impression:   Moderate medial compartment and mild patellofemoral compartment   degnerative changes of the knee             Assessment    Assessment:  1. Primary osteoarthritis of both knees    2. Pain in both knees, unspecified chronicity        Plan:  1. Continue over-the-counter medication as needed for discomfort  2. Left knee arthritis--early moderate medial and mild patellofemoral.  Observe for now.  3. Right medial compartment arthritis--end-stage medial and early moderate patellofemoral.  Patient tells me quite clearly today that he has symptoms only on the medial side of the knee.  He has directly asked me about partial replacement rather than total knee arthroplasty.  I have strongly recommended Dr. Diaz at  and he will be referred for consideration of right medial unicompartmental arthroplasty.  Refill the patient that he may not fit criteria or he may have changes intraoperatively that may require total knee arthroplasty.  Patient appears to understand.        Norman Newell MD  03/22/22  12:40 EDT      Dictated Utilizing Dragon Dictation.

## 2022-04-04 ENCOUNTER — TELEPHONE (OUTPATIENT)
Dept: ORTHOPEDIC SURGERY | Facility: CLINIC | Age: 47
End: 2022-04-04

## 2025-06-09 NOTE — TELEPHONE ENCOUNTER
Faxed to Dr. Diaz's office.  Confirmmed.  Lara  
Provider: FRANCISCA  Caller: AMAN    Phone Number: [phone]  Reason for Call: PT IS CALLING TO TO CHECK THE STATUS ON HIS REFERRAL TO DR KASPER AT [facility] FOR HIS KNEE REPLACEMENT     [phone] [address] [place] [address]  
No